# Patient Record
Sex: FEMALE | Race: BLACK OR AFRICAN AMERICAN
[De-identification: names, ages, dates, MRNs, and addresses within clinical notes are randomized per-mention and may not be internally consistent; named-entity substitution may affect disease eponyms.]

---

## 2019-09-29 ENCOUNTER — HOSPITAL ENCOUNTER (INPATIENT)
Dept: HOSPITAL 47 - EC | Age: 49
LOS: 4 days | Discharge: HOME | DRG: 392 | End: 2019-10-03
Attending: HOSPITALIST | Admitting: HOSPITALIST
Payer: COMMERCIAL

## 2019-09-29 VITALS — BODY MASS INDEX: 28.8 KG/M2

## 2019-09-29 DIAGNOSIS — D25.9: ICD-10-CM

## 2019-09-29 DIAGNOSIS — K29.50: Primary | ICD-10-CM

## 2019-09-29 DIAGNOSIS — J45.909: ICD-10-CM

## 2019-09-29 DIAGNOSIS — R82.71: ICD-10-CM

## 2019-09-29 DIAGNOSIS — Z98.891: ICD-10-CM

## 2019-09-29 DIAGNOSIS — Z71.6: ICD-10-CM

## 2019-09-29 DIAGNOSIS — N83.202: ICD-10-CM

## 2019-09-29 DIAGNOSIS — Z82.49: ICD-10-CM

## 2019-09-29 DIAGNOSIS — K44.9: ICD-10-CM

## 2019-09-29 DIAGNOSIS — K80.20: ICD-10-CM

## 2019-09-29 DIAGNOSIS — F17.210: ICD-10-CM

## 2019-09-29 LAB
ALBUMIN SERPL-MCNC: 4.7 G/DL (ref 3.5–5)
ALP SERPL-CCNC: 107 U/L (ref 38–126)
ALT SERPL-CCNC: <6 U/L (ref 9–52)
AMYLASE SERPL-CCNC: 114 U/L (ref 30–110)
ANION GAP SERPL CALC-SCNC: 13 MMOL/L
AST SERPL-CCNC: 44 U/L (ref 14–36)
BASOPHILS # BLD AUTO: 0.1 K/UL (ref 0–0.2)
BASOPHILS NFR BLD AUTO: 1 %
BUN SERPL-SCNC: 9 MG/DL (ref 7–17)
CALCIUM SPEC-MCNC: 9.9 MG/DL (ref 8.4–10.2)
CHLORIDE SERPL-SCNC: 106 MMOL/L (ref 98–107)
CO2 SERPL-SCNC: 18 MMOL/L (ref 22–30)
EOSINOPHIL # BLD AUTO: 0.1 K/UL (ref 0–0.7)
EOSINOPHIL NFR BLD AUTO: 1 %
ERYTHROCYTE [DISTWIDTH] IN BLOOD BY AUTOMATED COUNT: 4.98 M/UL (ref 3.8–5.4)
ERYTHROCYTE [DISTWIDTH] IN BLOOD: 14.3 % (ref 11.5–15.5)
GLUCOSE SERPL-MCNC: 117 MG/DL (ref 74–99)
HCT VFR BLD AUTO: 44.9 % (ref 34–46)
HGB BLD-MCNC: 15.2 GM/DL (ref 11.4–16)
KETONES UR QL STRIP.AUTO: (no result)
LYMPHOCYTES # SPEC AUTO: 0.9 K/UL (ref 1–4.8)
LYMPHOCYTES NFR SPEC AUTO: 15 %
MCH RBC QN AUTO: 30.6 PG (ref 25–35)
MCHC RBC AUTO-ENTMCNC: 33.9 G/DL (ref 31–37)
MCV RBC AUTO: 90.3 FL (ref 80–100)
MONOCYTES # BLD AUTO: 0.2 K/UL (ref 0–1)
MONOCYTES NFR BLD AUTO: 3 %
NEUTROPHILS # BLD AUTO: 4.7 K/UL (ref 1.3–7.7)
NEUTROPHILS NFR BLD AUTO: 78 %
PH UR: 6 [PH] (ref 5–8)
PLATELET # BLD AUTO: 229 K/UL (ref 150–450)
POTASSIUM SERPL-SCNC: 4.8 MMOL/L (ref 3.5–5.1)
PROT SERPL-MCNC: 8.6 G/DL (ref 6.3–8.2)
PROT UR QL: (no result)
RBC UR QL: >182 /HPF (ref 0–5)
SODIUM SERPL-SCNC: 137 MMOL/L (ref 137–145)
SP GR UR: 1.03 (ref 1–1.03)
SQUAMOUS UR QL AUTO: 11 /HPF (ref 0–4)
UROBILINOGEN UR QL STRIP: 2 MG/DL (ref ?–2)
WBC # BLD AUTO: 6 K/UL (ref 3.8–10.6)
WBC #/AREA URNS HPF: 7 /HPF (ref 0–5)

## 2019-09-29 PROCEDURE — 96374 THER/PROPH/DIAG INJ IV PUSH: CPT

## 2019-09-29 PROCEDURE — 43239 EGD BIOPSY SINGLE/MULTIPLE: CPT

## 2019-09-29 PROCEDURE — 96361 HYDRATE IV INFUSION ADD-ON: CPT

## 2019-09-29 PROCEDURE — 88305 TISSUE EXAM BY PATHOLOGIST: CPT

## 2019-09-29 PROCEDURE — 81001 URINALYSIS AUTO W/SCOPE: CPT

## 2019-09-29 PROCEDURE — 74177 CT ABD & PELVIS W/CONTRAST: CPT

## 2019-09-29 PROCEDURE — 76856 US EXAM PELVIC COMPLETE: CPT

## 2019-09-29 PROCEDURE — 84702 CHORIONIC GONADOTROPIN TEST: CPT

## 2019-09-29 PROCEDURE — 82150 ASSAY OF AMYLASE: CPT

## 2019-09-29 PROCEDURE — 76705 ECHO EXAM OF ABDOMEN: CPT

## 2019-09-29 PROCEDURE — 36415 COLL VENOUS BLD VENIPUNCTURE: CPT

## 2019-09-29 PROCEDURE — 80053 COMPREHEN METABOLIC PANEL: CPT

## 2019-09-29 PROCEDURE — 83690 ASSAY OF LIPASE: CPT

## 2019-09-29 PROCEDURE — 85025 COMPLETE CBC W/AUTO DIFF WBC: CPT

## 2019-09-29 PROCEDURE — 96375 TX/PRO/DX INJ NEW DRUG ADDON: CPT

## 2019-09-29 PROCEDURE — 99285 EMERGENCY DEPT VISIT HI MDM: CPT

## 2019-09-29 NOTE — CT
EXAMINATION TYPE: CT abdomen pelvis w con

 

DATE OF EXAM: 9/29/2019

 

COMPARISON: None

 

HISTORY: Abdominal pain

 

CT DLP: mGycm

Automated exposure control for dose reduction was used.

 

TECHNIQUE:  Helical acquisition of images was performed from the lung bases through the pelvis.

 

CONTRAST: 

Performed and , patient injected with mL of .

The contrast was Isovue 100 mL.

FINDINGS: 

Lung bases are clear. There is no pleural effusion. Heart is moderately enlarged.

 

There are small calcified gallstones. Liver appears normal. Bile ducts are not dilated. Spleen appear
s normal. There is no pancreatic mass. Stomach appears normal.

 

There is no adrenal mass. Kidneys show satisfactory contrast opacification. There is no hydronephrosi
s. Ureters are not dilated. There is no retroperitoneal adenopathy. There is massive enlargement of t
he uterus. Uterus measures 15 x 10 cm with apparent large mass on the anterior wall of the fundus. Ur
inary bladder is apparently empty. There is no free fluid in the pelvis. There is no inguinal hernia.


 

There is no mesenteric edema. There is no ascites or free air. Appendix appears normal. There is no s
ign of bowel obstruction.

There is a degenerative first-degree L4-5 spondylolisthesis without definite spondylolysis.

IMPRESSION: 

THERE IS LARGE MIDLINE PELVIC MASS THAT IS THOUGHT TO BE DUE TO MASSIVE ENLARGEMENT OF THE UTERUS. FO
LLOW-UP RECOMMENDED. OTHER PELVIC MASS ETIOLOGY NOT EXCLUDED..

 

CARDIOMEGALY.

## 2019-09-29 NOTE — US
EXAMINATION TYPE: US abdomen limited

 

DATE OF EXAM: 9/29/2019

 

COMPARISON: NONE

 

CLINICAL HISTORY: RUQ. N/V.  CT showed gallstones.

 

EXAM MEASUREMENTS:

 

Liver Length:  18.5 cm   

Gallbladder Wall:  0.2 cm   

CBD:  0.3 cm

Right Kidney:  10.2 x 6.3 x 4.4 cm

 

 

 

Pancreas:  wnl

Liver:  Enlarged in size  

Gallbladder:  Multiple mobile echogenic foci

**Evidence for sonographic Gonzales's sign:  neg

CBD:  wnl 

Right Kidney:  No hydronephrosis or masses seen 

 

 

 

IMPRESSION: There are numerous gallstones. No dilated ducts. No ascites.

## 2019-09-30 RX ADMIN — ONDANSETRON PRN MG: 2 INJECTION INTRAMUSCULAR; INTRAVENOUS at 18:58

## 2019-09-30 RX ADMIN — CEFAZOLIN SCH MLS/HR: 330 INJECTION, POWDER, FOR SOLUTION INTRAMUSCULAR; INTRAVENOUS at 07:56

## 2019-09-30 RX ADMIN — CEFAZOLIN SCH: 330 INJECTION, POWDER, FOR SOLUTION INTRAMUSCULAR; INTRAVENOUS at 18:52

## 2019-09-30 RX ADMIN — CEFAZOLIN SCH: 330 INJECTION, POWDER, FOR SOLUTION INTRAMUSCULAR; INTRAVENOUS at 02:03

## 2019-09-30 NOTE — CONS
CONSULTATION



DATE OF DICTATION:

2019



REASON FOR CONSULTATION:

Nausea, vomiting of 3 days' duration.



HISTORY OF PRESENT ILLNESS:

The patient is a 49-year-old pleasant white female who presented to the emergency room

complaining of abdominal pain associated with nausea, vomiting for the last 3 days'

duration.  The patient states that the pain is mostly in the suprapubic area and left

lower quadrant area, but she has been having at least several episodes of nausea and

vomiting for the last 2 days.  No coffee-ground emesis. She came into the emergency

room and she had a CT of the abdomen and pelvis done that showed significantly enlarged

uterus and presence of gallstones.  Otherwise it was unremarkable.  Patient at present

is on PPIs and Zofran and her symptoms are gradually improving.  She denies any

abdominal pain today.  She had one episode of emesis on a clear liquid diet this

afternoon.  No prior history of peptic ulcer disease.  No recent NSAID use.  She had a

similar episode in August of this year that lasted for 2 days and then it resolved.

She reports no prior history of peptic ulcer disease.



PAST MEDICAL HISTORY:

Asthma.



PAST SURGICAL HISTORY:

.



MEDICATIONS:

Medications at home include Motrin and Zofran.



ALLERGIES:

NONE.



SOCIAL HISTORY:

Chronic smoker.  No alcohol use.



FAMILY HISTORY:

Mother had hypertension.



REVIEW OF SYSTEMS:

CARDIOPULMONARY: No chest pain or shortness of breath.

GENITOURINARY: No dysuria or hematuria.

MUSCULOSKELETAL: Unremarkable.

SKIN: Unremarkable.

ENDOCRINE: Unremarkable.

PSYCHIATRIC: Unremarkable.

NEUROLOGY: Unremarkable.

ENT/VISION: Unremarkable.

CONSTITUTIONAL: No recent weight loss. No fever, chills, night sweats.



PHYSICAL EXAMINATION:

Blood pressure 166/84, pulse rate 74, temperature 98.1.

HEENT examination unremarkable. Conjunctivae pink. Sclerae anicteric. Oral cavity no

lesions.

NECK: No JVD or lymph node enlargement.

CHEST: Clear to auscultation.

HEART:  Regular rate and rhythm.

ABDOMEN:  Soft.  There was mild tenderness in the epigastric area. Uterus was palpable

almost to the umbilicus, which appeared tender.

EXTREMITIES: No pedal edema.

SKIN: No rashes.

NEUROLOGIC:  Alert and oriented x3.  No focal deficits.



LABS/IMAGING:

WBC 6, hemoglobin 15.2, platelets normal.  AST, ALT 44 and 6, respectively.  T-

bilirubin 1.5, alkaline phosphatase 107. Amylase 114, lipase 45.



CT of the abdomen and pelvis done in the emergency room showed evidence of gallstones

and significantly enlarged uterus.  Liver appears normal.  Bile ducts are not dilated.



IMPRESSION:

1. Acute onset of nausea and vomiting for the last 3 days' duration.  Patient at

    present on PPIs with Protonix 40 mg twice daily as well as Zofran and Reglan as

    needed. Her symptoms appear to be gradually improving.  The nausea and vomiting

    could be secondary to lower abdominal pain that she presented with related to the

    pelvic mass.

2. Pelvic mass/significantly enlarged uterus that can be clinically palpable.  GYN was

    consulted.

3. Gallstones noted on recent CT scan; unclear if they are causing any symptoms at the

    present time.



RECOMMENDATIONS:

1. Continue with Protonix as well as Zofran.

2. Continue with Reglan as needed.

3. If the nausea and vomiting do not resolve in the next 24 hours, we will consider an

    upper endoscopy during this hospitalization.

4. Await GYN consultation.

We will follow the patient closely during her hospital stay. Thank you for this

consultation.





JOSE GUADALUPE / DEISI: [id number] / Job#: [id number]

## 2019-09-30 NOTE — P.HPIM
History of Present Illness





49-year-old female with a past medical history of  presents to the 

emergency department for a chief complaint of nausea and vomiting.  Patient has 

had nausea and vomiting for 2 days.  States that she has had this type of sy

mptom before but it has gone away.  Patient also admits to lower abdominal pain.

 Denies any upper abdominal pain.  Denies any diarrhea.  Patient had a CAT scan 

of the abdomen which showed midline pelvic mass massive enlargement of the 

uterus, ultrasound of the abdomen did show multiple gallstones may be 

contributing to his nausea.  Patient's pain is presently made lower quadrant of 

the abdomen and 7/10 in severity.  Patient denied dysuria





Past Medical History


Past Medical History: No Reported History, Asthma


History of Any Multi-Drug Resistant Organisms: None Reported


Past Surgical History:  Section


Past Psychological History: No Psychological Hx Reported


Smoking Status: Current every day smoker


Past Alcohol Use History: Rare


Past Drug Use History: Marijuana


Additional Drug Use History / Comment(s): uses marijuana daily.





- Past Family History


  ** Mother


Family Medical History: Hypertension





Medications and Allergies


                                Home Medications











 Medication  Instructions  Recorded  Confirmed  Type


 


Ibuprofen [Motrin Ib] 600 mg PO Q6H PRN 19 History


 


Zofran Unknown Dose 1 tab PO Q4H PRN 19 History








                                    Allergies











Allergy/AdvReac Type Severity Reaction Status Date / Time


 


No Known Allergies Allergy   Verified 19 15:18














Physical Exam


Vitals: 


                                   Vital Signs











  Temp Pulse Pulse Resp BP BP Pulse Ox


 


 19 06:53  98.4 F   72  16   167/80  98


 


 19 01:22       183/93 


 


 19 23:28  97.3 F L   70  18   176/105  99


 


 19 23:01  98.7 F  83   18  171/95   100


 


 19 19:21   84   18  176/95   98


 


 19 14:03  97.6 F  94   20  184/97   99








                                Intake and Output











 19





 22:59 06:59 14:59


 


Other:   


 


  Voiding Method  Toilet 

















PHYSICAL EXAMINATION: 





GENERAL: The patient is alert and oriented x3, not in any acute distress. Well 

developed, well nourished. 


HEENT: Pupils are round and equally reacting to light. EOMI. No scleral icterus.

No conjunctival pallor. Normocephalic, atraumatic. No pharyngeal erythema. No 

thyromegaly. 


CARDIOVASCULAR: S1 and S2 present. No murmurs, rubs, or gallops. 


PULMONARY: Chest is clear to auscultation, no wheezing or crackles. 


ABDOMEN: Soft, no tenderness in the suprapubic area and that her uterus is 

palpable and appreciable.  In that area, nondistended, normoactive bowel sounds.

No palpable organomegaly. 


MUSCULOSKELETAL: No joint swelling or deformity.


EXTREMITIES: No cyanosis, clubbing, or pedal edema. 


NEUROLOGICAL: Gross neurological examination did not reveal any focal deficits. 


SKIN: No rashes. 

















Results


CBC & Chem 7: 


                                 19 15:05





                                 19 15:05


Labs: 


                  Abnormal Lab Results - Last 24 Hours (Table)











  19 Range/Units





  15:05 15:05 15:05 


 


Lymphocytes #   0.9 L   (1.0-4.8)  k/uL


 


Carbon Dioxide  18 L    (22-30)  mmol/L


 


Glucose  117 H    (74-99)  mg/dL


 


Total Bilirubin  1.5 H    (0.2-1.3)  mg/dL


 


AST  44 H    (14-36)  U/L


 


ALT  <6 L    (9-52)  U/L


 


Total Protein  8.6 H    (6.3-8.2)  g/dL


 


Amylase  114 H    ()  U/L


 


Urine Appearance    Cloudy H  (Clear)  


 


Urine Protein    1+ H  (Negative)  


 


Urine Ketones    2+ H  (Negative)  


 


Urine Blood    Large H  (Negative)  


 


Ur Leukocyte Esterase    Small H  (Negative)  


 


Urine RBC    >182 H  (0-5)  /hpf


 


Urine WBC    7 H  (0-5)  /hpf


 


Ur Squamous Epith Cells    11 H  (0-4)  /hpf


 


Urine Mucus    Many H  (None)  /hpf














Thrombosis Risk Factor Assmnt





- Choose All That Apply


Any of the Below Risk Factors Present?: Yes


Each Factor Represents 1 point: Age 41-60 years


Other Risk Factors: No


Other congenital or acquired thrombophilia - If yes, enter type in comment: No


Thrombosis Risk Factor Assessment Total Risk Factor Score: 1


Thrombosis Risk Factor Assessment Level: Low Risk





Assessment and Plan


Plan: 


-Nausea: Probably secondary to gallstones general surgery was consult and the 

responsibility of gastritis to patient takes nonsteroidal anti-inflammatory set 

home and will be started on Protonix.


-Abdominal pain may be related to enlarged uterus, gynecology was consult it.


-Asymptomatic bacteriuria contaminated urine sample we'll repeat UA again 

although patient doesn't have any symptoms of UTI except for suprapubic pain 

which may be related to enlarged uterus


-Asthma without any acute exacerbation


-Nicotine abuse and marijuana use: Counseling was provided

## 2019-10-01 LAB
ALBUMIN SERPL-MCNC: 3.9 G/DL (ref 3.5–5)
ALP SERPL-CCNC: 68 U/L (ref 38–126)
ALT SERPL-CCNC: 25 U/L (ref 9–52)
ANION GAP SERPL CALC-SCNC: 11 MMOL/L
AST SERPL-CCNC: 23 U/L (ref 14–36)
BUN SERPL-SCNC: 13 MG/DL (ref 7–17)
CALCIUM SPEC-MCNC: 9.1 MG/DL (ref 8.4–10.2)
CHLORIDE SERPL-SCNC: 99 MMOL/L (ref 98–107)
CO2 SERPL-SCNC: 24 MMOL/L (ref 22–30)
GLUCOSE SERPL-MCNC: 89 MG/DL (ref 74–99)
POTASSIUM SERPL-SCNC: 3.6 MMOL/L (ref 3.5–5.1)
PROT SERPL-MCNC: 7.2 G/DL (ref 6.3–8.2)
SODIUM SERPL-SCNC: 134 MMOL/L (ref 137–145)

## 2019-10-01 RX ADMIN — CEFAZOLIN SCH MLS/HR: 330 INJECTION, POWDER, FOR SOLUTION INTRAMUSCULAR; INTRAVENOUS at 03:04

## 2019-10-01 RX ADMIN — CEFAZOLIN SCH MLS/HR: 330 INJECTION, POWDER, FOR SOLUTION INTRAMUSCULAR; INTRAVENOUS at 06:58

## 2019-10-01 RX ADMIN — PANTOPRAZOLE SODIUM SCH MG: 40 TABLET, DELAYED RELEASE ORAL at 06:58

## 2019-10-01 RX ADMIN — ONDANSETRON PRN MG: 2 INJECTION INTRAMUSCULAR; INTRAVENOUS at 03:04

## 2019-10-01 NOTE — US
EXAMINATION TYPE: US pelvic complete

 

DATE OF EXAM: 10/1/2019

 

COMPARISON: CT 2019 

 

CLINICAL HISTORY: enlarged uterus. History of ectopic pregnancy. .

 

TECHNIQUE:  Transabdominal (TA).  Transabdominal sonographic images of the pelvis were acquired.  Tra
nsvaginal sonographic images were medically necessary to better assess the following anatomy:

 

Date of LMP:  2019

 

EXAM MEASUREMENTS:

 

Uterus:  17.1 x 13.7 x 9.5 cm

Endometrial Stripe: Unable to see cm

Right Ovary:  Surgically absent cm

Left Ovary:  3.6 x 2.6 x 2.3 cm

 

 

 

1. Uterus:  Anteverted   Enlarged with heterogeneous texture. ? Multiple myomas

2. Endometrium:  Not well seen with uterine heterogenicity.

3. Right Ovary:  Surgically absent d/t ectopic

4. Left Ovary:  with small cyst = 1.5 x 1.7 x 1.3 cm 

**Spectral, color and waveform doppler imaging shows good arterial and venous flow within the ovaries
; there is no evidence for ovarian torsion.

5. Bilateral Adnexa:  wnl

6. Posterior cul-de-sac:  wnl

 

 

 

IMPRESSION: 

1. There may be multiple uterine fibroids present.

2. Small left ovarian cyst measuring 1.5 cm

## 2019-10-01 NOTE — PN
PROGRESS NOTE



DATE OF DICTATION:

10/01/2019



The patient is a 49-year-old -American female admitted to the hospital with

abdominal pain associated with nausea, vomiting for the last 3 days' duration.  She

continues to remain symptomatic despite being on Reglan and Zofran as well as Protonix.

She had about 3 episodes of emesis this morning.  Reports no fever, chills or night

sweats.



PHYSICAL EXAMINATION:

She appears comfortable.  No apparent distress.

VITAL SIGNS:  Stable.  Blood pressure is 154/78, pulse rate 81, temperature 99.1.

HEENT examination unremarkable. Conjunctivae pink. Sclerae anicteric. Oral cavity no

lesions.

NECK: No JVD or lymph node enlargement.

CHEST: Clear to auscultation.

HEART:  Regular rate and rhythm.

ABDOMEN:  Soft.  Uterus is palpable almost to the umbilical area.



Pelvis ultrasound done today showed enlarged heterogeneous uterus, possible multiple

myomas.



IMPRESSION:

1. Nausea, vomiting for the last 3 days' duration.  Rule out peptic ulcer disease.

2. Gallstones.

3. Enlarged uterus.



RECOMMENDATIONS:

1. Continue with Protonix, Zofran, Reglan as needed.

2. Proceed with an upper endoscopy tomorrow.

3. Discussed with the patient risks, benefits and complications of the procedure, and

    she is agreeable to it.

Thank you for this consultation.





MMODL / IJN: [id number] / Job#: [id number]

## 2019-10-01 NOTE — P.OBCN
History of Present Illness


Consult date: 10/01/19


Reason for consult: pelvic mass (Enlarged uterus)


Chief complaint: Nausea


History of present illness: 


49-year-old  presents to the hospital complaining of nausea vomiting and 

abdominal pain.  On CT scanned was found incidentally in the pelvis, enlarged 

uterus; 15 x 10 cm.  I was consulted for the enlarged uterus.  The patient has a

pretty good stories tell.  3 years ago she weighed 363 pounds.  Advice from her 

doctor due to her poor knees, she lost over 100 pounds by going to [employer] and eating less.  She started at [employer] and continues 

her diet, water and exercise.  As she was losing her weight, she did note a firm

lump in her abdomen.  On exam today is noted to be her uterus.  Therefore, this 

is not a new problem she's had this large uterus with likely fibroids for quite 

some time.  I will get an ultrasound on her pelvis today in follow-up of her 

outpatient.  She will likely need a hysterectomy but at this time, she needs to 

find the white is causing the nausea and deal with that first.








Review of Systems


All systems: negative


Constitutional: Denies chills, Denies fever


Eyes: denies blurred vision, denies pain


Ears, nose, mouth and throat: Denies headache, Denies sore throat


Cardiovascular: Denies chest pain, Denies shortness of breath


Respiratory: Denies cough


Gastrointestinal: Reports nausea, Reports vomiting, Denies abdominal pain, 

Denies diarrhea


Genitourinary: Denies dysuria, Denies hematuria


Musculoskeletal: Denies myalgias


Integumentary: Denies pruritus, Denies rash


Neurological: Denies numbness, Denies weakness


Psychiatric: Denies anxiety, Denies depression


Endocrine: Denies fatigue, Denies weight change





Past Medical History


Past Medical History: No Reported History, Asthma


Additional Past Medical History / Comment(s): Her menses are irregular.  She had

one in March and is currently on that again.  They last about 3 days are 

extremely heavy.


History of Any Multi-Drug Resistant Organisms: None Reported


Past Surgical History:  Section


Past Psychological History: No Psychological Hx Reported


Smoking Status: Current every day smoker


Past Alcohol Use History: Rare


Past Drug Use History: Marijuana


Additional Drug Use History / Comment(s): uses marijuana daily.





- Past Family History


  ** Mother


Family Medical History: Hypertension





Medications and Allergies


                                Home Medications











 Medication  Instructions  Recorded  Confirmed  Type


 


Ibuprofen [Motrin Ib] 600 mg PO Q6H PRN 19 History


 


Zofran Unknown Dose 1 tab PO Q4H PRN 19 History








                                    Allergies











Allergy/AdvReac Type Severity Reaction Status Date / Time


 


No Known Allergies Allergy   Verified 19 15:18














Exam


Osteopathic Statement: *.  No significant issues noted on an osteopathic 

structural exam other than those noted in the History and Physical/Consult.


                                   Vital Signs











  Temp Pulse Resp BP Pulse Ox


 


 10/01/19 04:52  98.4 F  71  16  162/80  100


 


 19 21:13  98.8 F  72  16  171/98  97


 


 19 13:34  98.1 F  74  16  166/84  100








                                Intake and Output











 09/30/19 10/01/19 10/01/19





 22:59 06:59 14:59


 


Other:   


 


  Voiding Method  Toilet Toilet


 


  # Voids 3 3 














Abdomen: Her uterus is firm, mobile, and is about 2 finger breaths below the um

bilicus.





Results


Result Diagrams: 


                                 19 15:05





                                 19 15:05





Assessment and Plan


(1) Fibroid uterus


Current Visit: Yes   Status: Acute   Code(s): D25.9 - LEIOMYOMA OF UTERUS, 

UNSPECIFIED   SNOMED Code(s): 20436042


   





(2) Cholelithiasis


Current Visit: Yes   Status: Acute   Code(s): K80.20 - CALCULUS OF GALLBLADDER 

W/O CHOLECYSTITIS W/O OBSTRUCTION   SNOMED Code(s): 631500920


   


Plan: 





1.  I will get an ultrasound of the pelvis while patient is inpatient.


2.  She likely does need a hysterectomy as the uterus is causing her frequency 

of urination and some pelvic discomfort at times.  I will discuss this further 

with the patient in outpatient setting.  She can follow up in my office in about

2 weeks after she is discharged from this hospital.

## 2019-10-01 NOTE — P.GSCN
<Franchesca Wise - Last Filed: 10/01/19 14:03>





History of Present Illness


Consult date: 10/01/19


Reason for Consult: 





gallstones





Requesting physician: Rocael Enciso


History of present illness: 





CHIEF COMPLAINT: gallstones





HISTORY OF PRESENT ILLNESS: 49 year old female admitted to the hospital sec

ondary to abdominal pain. Patient reports she began having lower bilateral 

quadrant abdominal pain a few days ago. She reports nausea and emesis for the 

last two days. This morning she reports continued nausea and one episode of 

emesis. She denies diarrhea or constipation. Patient reports having similiar 

episodes to this about every 2-3 months and states she was hospitalized at [facility] recently for similar symptoms. She states she was not given

a diagnosis and was discharged home. 





PAST MEDICAL HISTORY: 


See list.





PAST SURGICAL HISTORY: 


See list.





SOCIAL HISTORY: No illicit drug use.  





REVIEW OF SYSTEMS: 


CONSTITUTIONAL: Denies fever or chills.


HEENT: Denies blurred vision, vision changes, or eye pain. Denies hemoptysis 


CARDIOVASCULAR: Denies chest pain or pressure.


RESPIRATORY: No shortness of breath. 


GASTROINTESTINAL: Refer to HPI for pertinent findings


HEMATOLOGIC: Denies bleeding disorders.


GENITOURINARY:  Denies any blood in urine.


SKIN: Denies pruitis. Denies rash.





PHYSICAL EXAM: 


VITAL SIGNS: Reviewed.


GENERAL: Well-developed in no acute distress. 


HEENT:  No sclera icterus. Extraocular movements grossly intact.  Moist buccal 

mucosa. Head is atraumatic, normocephalic. 


ABDOMEN:  Soft.  Nondistended. Tenderness with palpation of suprapubic region. 

Palpable enlarged uterus.


NEUROLOGIC: Alert and oriented. Cranial nerves II through XII grossly intact.





LABORATORY DATA:


WBC 6.0. Hemoglobin 15.2


Bilirubin 1.5.  AST 44.  ALT less than 6.  Alkaline phosphatase 107.  Amylase 

114.  Lipase 45.





IMAGIN. CT abdominal pelvis: large pelvic mass thought to be uterine enlargement


2. US gallbladder: numerous gallstones





ASSESSMENT: 


1.  Nausea vomiting x 2 days


2.  Cholelithiasis


3.  Enlarged uterus





PLAN: 


Case discussed with Dr. Osborne. No surgical intervention recommended at this 

time. Recommend continued follow up with OBGYN for uterine enlargement. Possible

EGD per Dr. Saeed's dictation. Pending EGD results, will re-evaluate for 

cholecystectomy.





Nurse practitioner note has been reviewed by physician. Signing provider agrees 

with the documented findings, assessment, and plan of care. 








Past Medical History


Past Medical History: No Reported History, Asthma


Additional Past Medical History / Comment(s): Her menses are irregular.  She had

one in March and is currently on that again.  They last about 3 days are 

extremely heavy.


History of Any Multi-Drug Resistant Organisms: None Reported


Past Surgical History:  Section


Past Psychological History: No Psychological Hx Reported


Smoking Status: Current every day smoker


Past Alcohol Use History: Rare


Past Drug Use History: Marijuana


Additional Drug Use History / Comment(s): uses marijuana daily.





- Past Family History


  ** Mother


Family Medical History: Hypertension





Medications and Allergies


                                Home Medications











 Medication  Instructions  Recorded  Confirmed  Type


 


Ibuprofen [Motrin Ib] 600 mg PO Q6H PRN 19 History


 


Zofran Unknown Dose 1 tab PO Q4H PRN 19 History








                                    Allergies











Allergy/AdvReac Type Severity Reaction Status Date / Time


 


No Known Allergies Allergy   Verified 19 15:18














Surgical - Exam


                                   Vital Signs











Temp Pulse Resp BP Pulse Ox


 


 97.6 F   94   20   184/97   99 


 


 19 14:03  19 14:03  19 14:03  19 14:03  19 14:03














Results





- Labs





                                 19 15:05





                                 10/01/19 12:48


                  Abnormal Lab Results - Last 24 Hours (Table)











  10/01/19 Range/Units





  12:48 


 


Sodium  134 L  (137-145)  mmol/L








                                 Diabetes panel











  10/01/19 Range/Units





  12:48 


 


Sodium  134 L  (137-145)  mmol/L


 


Potassium  3.6  (3.5-5.1)  mmol/L


 


Chloride  99  ()  mmol/L


 


Carbon Dioxide  24  (22-30)  mmol/L


 


BUN  13  (7-17)  mg/dL


 


Creatinine  0.65  (0.52-1.04)  mg/dL


 


Glucose  89  (74-99)  mg/dL


 


Calcium  9.1  (8.4-10.2)  mg/dL


 


AST  23  (14-36)  U/L


 


ALT  25  (9-52)  U/L


 


Alkaline Phosphatase  68  ()  U/L


 


Total Protein  7.2  (6.3-8.2)  g/dL


 


Albumin  3.9  (3.5-5.0)  g/dL








                                  Calcium panel











  10/01/19 Range/Units





  12:48 


 


Calcium  9.1  (8.4-10.2)  mg/dL


 


Albumin  3.9  (3.5-5.0)  g/dL








                                 Pituitary panel











  10/01/19 Range/Units





  12:48 


 


Sodium  134 L  (137-145)  mmol/L


 


Potassium  3.6  (3.5-5.1)  mmol/L


 


Chloride  99  ()  mmol/L


 


Carbon Dioxide  24  (22-30)  mmol/L


 


BUN  13  (7-17)  mg/dL


 


Creatinine  0.65  (0.52-1.04)  mg/dL


 


Glucose  89  (74-99)  mg/dL


 


Calcium  9.1  (8.4-10.2)  mg/dL








                                  Adrenal panel











  10/01/19 Range/Units





  12:48 


 


Sodium  134 L  (137-145)  mmol/L


 


Potassium  3.6  (3.5-5.1)  mmol/L


 


Chloride  99  ()  mmol/L


 


Carbon Dioxide  24  (22-30)  mmol/L


 


BUN  13  (7-17)  mg/dL


 


Creatinine  0.65  (0.52-1.04)  mg/dL


 


Glucose  89  (74-99)  mg/dL


 


Calcium  9.1  (8.4-10.2)  mg/dL


 


Total Bilirubin  0.9  (0.2-1.3)  mg/dL


 


AST  23  (14-36)  U/L


 


ALT  25  (9-52)  U/L


 


Alkaline Phosphatase  68  ()  U/L


 


Total Protein  7.2  (6.3-8.2)  g/dL


 


Albumin  3.9  (3.5-5.0)  g/dL














<Los Osborne - Last Filed: 10/02/19 07:54>





History of Present Illness


History of present illness: 


As above.  Patient was seen yesterday around 11:30 AM.  Patient with 

intermittent episodes of nausea and vomiting.  No significant pain.  Would be 

willing to consider cholecystectomy at time of hysterectomy.  Otherwise continue

GI workup of ongoing nausea.








Surgical - Exam


                                   Vital Signs











Temp Pulse Resp BP Pulse Ox


 


 97.6 F   94   20   184/97   99 


 


 19 14:03  19 14:03  19 14:03  19 14:03  19 14:03














Results





- Labs





                                 19 15:05





                                 10/01/19 12:48


                  Abnormal Lab Results - Last 24 Hours (Table)











  10/01/19 Range/Units





  12:48 


 


Sodium  134 L  (137-145)  mmol/L








                                 Diabetes panel











  10/01/19 Range/Units





  12:48 


 


Sodium  134 L  (137-145)  mmol/L


 


Potassium  3.6  (3.5-5.1)  mmol/L


 


Chloride  99  ()  mmol/L


 


Carbon Dioxide  24  (22-30)  mmol/L


 


BUN  13  (7-17)  mg/dL


 


Creatinine  0.65  (0.52-1.04)  mg/dL


 


Glucose  89  (74-99)  mg/dL


 


Calcium  9.1  (8.4-10.2)  mg/dL


 


AST  23  (14-36)  U/L


 


ALT  25  (9-52)  U/L


 


Alkaline Phosphatase  68  ()  U/L


 


Total Protein  7.2  (6.3-8.2)  g/dL


 


Albumin  3.9  (3.5-5.0)  g/dL








                                  Calcium panel











  10/01/19 Range/Units





  12:48 


 


Calcium  9.1  (8.4-10.2)  mg/dL


 


Albumin  3.9  (3.5-5.0)  g/dL








                                 Pituitary panel











  10/01/19 Range/Units





  12:48 


 


Sodium  134 L  (137-145)  mmol/L


 


Potassium  3.6  (3.5-5.1)  mmol/L


 


Chloride  99  ()  mmol/L


 


Carbon Dioxide  24  (22-30)  mmol/L


 


BUN  13  (7-17)  mg/dL


 


Creatinine  0.65  (0.52-1.04)  mg/dL


 


Glucose  89  (74-99)  mg/dL


 


Calcium  9.1  (8.4-10.2)  mg/dL








                                  Adrenal panel











  10/01/19 Range/Units





  12:48 


 


Sodium  134 L  (137-145)  mmol/L


 


Potassium  3.6  (3.5-5.1)  mmol/L


 


Chloride  99  ()  mmol/L


 


Carbon Dioxide  24  (22-30)  mmol/L


 


BUN  13  (7-17)  mg/dL


 


Creatinine  0.65  (0.52-1.04)  mg/dL


 


Glucose  89  (74-99)  mg/dL


 


Calcium  9.1  (8.4-10.2)  mg/dL


 


Total Bilirubin  0.9  (0.2-1.3)  mg/dL


 


AST  23  (14-36)  U/L


 


ALT  25  (9-52)  U/L


 


Alkaline Phosphatase  68  ()  U/L


 


Total Protein  7.2  (6.3-8.2)  g/dL


 


Albumin  3.9  (3.5-5.0)  g/dL

## 2019-10-02 VITALS — RESPIRATION RATE: 16 BRPM

## 2019-10-02 PROCEDURE — 0DB78ZX EXCISION OF STOMACH, PYLORUS, VIA NATURAL OR ARTIFICIAL OPENING ENDOSCOPIC, DIAGNOSTIC: ICD-10-PCS

## 2019-10-02 RX ADMIN — CEFAZOLIN SCH: 330 INJECTION, POWDER, FOR SOLUTION INTRAMUSCULAR; INTRAVENOUS at 16:30

## 2019-10-02 RX ADMIN — CEFAZOLIN SCH MLS/HR: 330 INJECTION, POWDER, FOR SOLUTION INTRAMUSCULAR; INTRAVENOUS at 07:12

## 2019-10-02 RX ADMIN — PANTOPRAZOLE SODIUM SCH: 40 TABLET, DELAYED RELEASE ORAL at 07:11

## 2019-10-02 RX ADMIN — CEFAZOLIN SCH: 330 INJECTION, POWDER, FOR SOLUTION INTRAMUSCULAR; INTRAVENOUS at 03:27

## 2019-10-02 NOTE — P.PN
Subjective


Progress Note Date: 10/02/19


Principal diagnosis: 





Nausea and vomiting





Patient scheduled today for EGD by GI.  Patient is very belligerent at this 

time.  She states multiple times that we are starting her period she is quite 

hungry.  She denies nausea.  No vomiting overnight.  Denies abdominal pain.





Objective





- Vital Signs


Vital signs: 


                                   Vital Signs











Temp  98.2 F   10/02/19 07:00


 


Pulse  70   10/02/19 07:00


 


Resp  16   10/02/19 07:00


 


BP  138/50   10/02/19 07:00


 


Pulse Ox  99   10/02/19 07:00








                                 Intake & Output











 10/01/19 10/02/19 10/02/19





 18:59 06:59 18:59


 


Intake Total 540 200 


 


Balance 540 200 


 


Intake:   


 


  Oral 540 200 


 


Other:   


 


  Voiding Method Toilet Toilet Toilet


 


  # Voids 2 1 3














- Exam





Abdomen: Soft, nontender, nondistended





- Labs


CBC & Chem 7: 


                                 09/29/19 15:05





                                 10/01/19 12:48


Labs: 


                  Abnormal Lab Results - Last 24 Hours (Table)











  10/01/19 Range/Units





  12:48 


 


Sodium  134 L  (137-145)  mmol/L














Assessment and Plan


(1) Cholelithiasis


Narrative/Plan: 


Patient very angry at this time because of the waiting to have her EGD 

performed.  Denies abdominal pain.  No right upper quadrant tenderness.  No 

plans for cholecystectomy at this time.  We'll sign off.  Please call if needed.


Current Visit: Yes   Status: Acute   Code(s): K80.20 - CALCULUS OF GALLBLADDER 

W/O CHOLECYSTITIS W/O OBSTRUCTION   SNOMED Code(s): 690255074

## 2019-10-02 NOTE — P.PCN
Date of Procedure: 10/02/19


Procedure(s) Performed: 


BRIEF HISTORY: Patient is a 49-year-old, pleasant, half intermittent female, 

admitted to the hospital with abdominal pain associated with nausea vomiting for

the last 3 days' duration.  She is hence scheduled for an upper endoscopy to 

evaluate further. 





PROCEDURE PERFORMED: Esophagogastroduodenoscopy with biopsy.





PREOPERATIVE DIAGNOSIS: Nausea vomiting for the last 3 days. 





IV sedation per anesthesia. 





PROCEDURE: After informed consent was obtained, the patient  was brought into 

the endoscopy unit. IV sedation was administered by Anesthesia under continuous 

monitoring. Initially the Olympus GIF-140 video endoscope was inserted into the 

mouth. Esophagus intubated without any difficulty. It was gradually advanced 

into the stomach and duodenum and carefully examined. The bulb and the second 

part of the duodenum appeared normal. The scope at this time was withdrawn to 

the stomach, adequately insufflated with air, and upon careful examination, 

mucosa of the antrum had mild patchy areas of erythema.  The prepyloric area 

which was biopsied.  The, body, cardia and the fundus appeared normal. The scope

was then withdrawn into the esophagus. The GE junction was located at 35 cm from

the incisors.  Small to moderate size hiatal hernia noted.  The esophagus 

appeared normal. There were no erosions or ulcerations seen and the patient 

tolerated the procedure well. 





IMPRESSION: 


1.  Minimal antral gastritis.


2.  Small to moderate size hiatal hernia.


3.  No evidence of peptic ulcer disease





RECOMMENDATIONS: The findings of this examination were discussed with the 

patient.  She was advised to follow with the biopsy results.  Diet will be 

advanced as tolerated.  Continue with Protonix 40 mg daily...

## 2019-10-02 NOTE — P.PN
Subjective


Progress Note Date: 10/01/19


Principal diagnosis: 


49-year-old female with a past medical history of  presents to the 

emergency department for a chief complaint of nausea and vomiting.  Patient has 

had nausea and vomiting for 2 days.  States that she has had this type of 

symptom before but it has gone away.  Patient also admits to lower abdominal 

pain.  Denies any upper abdominal pain.  Denies any diarrhea.  Patient had a CAT

scan of the abdomen which showed midline pelvic mass massive enlargement of the 

uterus, ultrasound of the abdomen did show multiple gallstones may be 

contributing to his nausea.  Patient's pain is presently made lower quadrant of 

the abdomen and 7/10 in severity.  Patient denied dysuria





10/1/2019


Patient is lying in bed in some mild discomfort due to nausea and vomiting. 

Patient has zofran ordered and states that it doesn't help much. Patient was 

just seen by OBGYN for the enlarged uterus and an ultrasound is being ordered. 

Patient is also being consulted by GI as well as surgery. Patient states that 

she had a previous episode of this nausea, vomiting, and abdominal pain 

approximately 2 months ago and was sent home with zofran and continued to vomit 

for three days and then started getting better. Patient states that she would 

like to get her gallbladder taken out. Patient also states that she started her 

menses on Saturday the same time that these symptoms started. Her last menstrual

period was in March of this year. Patient denies any chest pain, shortness of 

breath, or palpitations at this time. Patient is afebrile. Will continue to 

monitor closely and await surgery as well as GI recommendations. 








Objective





- Vital Signs


Vital signs: 


                                   Vital Signs











Temp  99.1 F   10/01/19 13:13


 


Pulse  81   10/01/19 13:13


 


Resp  16   10/01/19 13:13


 


BP  155/78   10/01/19 13:13


 


Pulse Ox  100   10/01/19 13:13








                                 Intake & Output











 10/01/19 10/01/19 10/02/19





 06:59 18:59 06:59


 


Intake Total  540 


 


Balance  540 


 


Intake:   


 


  Oral  540 


 


Other:   


 


  Voiding Method Toilet Toilet 


 


  # Voids 3 2 














- Exam


GENERAL: The patient is alert and oriented x3, not in any acute distress. Well 

developed, well nourished. 


HEENT: Pupils are round and equally reacting to light. EOMI. No scleral icterus.

No conjunctival pallor. Normocephalic, atraumatic. No pharyngeal erythema. No 

thyromegaly. 


CARDIOVASCULAR: S1 and S2 present. No murmurs, rubs, or gallops. 


PULMONARY: Chest is clear to auscultation, no wheezing or crackles. 


ABDOMEN: Soft, no tenderness in the suprapubic area and that her uterus is 

palpable and appreciable and firm.  In that area, nondistended, normoactive 

bowel sounds. No palpable organomegaly. 


MUSCULOSKELETAL: No joint swelling or deformity.


EXTREMITIES: No cyanosis, clubbing, or pedal edema. 


NEUROLOGICAL: Gross neurological examination did not reveal any focal deficits. 


SKIN: No rashes.








- Labs


CBC & Chem 7: 


                                 19 15:05





                                 10/01/19 12:48


Labs: 


                  Abnormal Lab Results - Last 24 Hours (Table)











  10/01/19 Range/Units





  12:48 


 


Sodium  134 L  (137-145)  mmol/L














Assessment and Plan


Assessment: 


-Nausea: Probably secondary to gallstones, general surgery was consulted and the

responsibility of gastritis to patient takes nonsteroidal anti-inflammatory at 

home and will be started on Protonix.


-Abdominal pain may be related to enlarged uterus, gynecology was consulted. An 

ultrasound of the uterus is ordered. OBGYN will follow patient in the outpatient

setting. 


-Asymptomatic bacteriuria contaminated urine sample we'll repeat UA again 

although patient doesn't have any symptoms of UTI except for suprapubic pain 

which may be related to enlarged uterus


-Asthma without any acute exacerbation


-Nicotine abuse and marijuana use: Counseling was provided

## 2019-10-02 NOTE — P.PN
Subjective


Progress Note Date: 10/02/19


Principal diagnosis: 


49-year-old female with a past medical history of  presents to the 

emergency department for a chief complaint of nausea and vomiting.  Patient has 

had nausea and vomiting for 2 days.  States that she has had this type of 

symptom before but it has gone away.  Patient also admits to lower abdominal 

pain.  Denies any upper abdominal pain.  Denies any diarrhea.  Patient had a CAT

scan of the abdomen which showed midline pelvic mass massive enlargement of the 

uterus, ultrasound of the abdomen did show multiple gallstones may be 

contributing to his nausea.  Patient's pain is presently made lower quadrant of 

the abdomen and 7/10 in severity.  Patient denied dysuria





10/1/2019


Patient is lying in bed in some mild discomfort due to nausea and vomiting. 

Patient has zofran ordered and states that it doesn't help much. Patient was 

just seen by OBGYN for the enlarged uterus and an ultrasound is being ordered. 

Patient is also being consulted by GI as well as surgery. Patient states that 

she had a previous episode of this nausea, vomiting, and abdominal pain 

approximately 2 months ago and was sent home with zofran and continued to vomit 

for three days and then started getting better. Patient states that she would 

like to get her gallbladder taken out. Patient also states that she started her 

menses on Saturday the same time that these symptoms started. Her last menstrual

period was in March of this year. Patient denies any chest pain, shortness of 

breath, or palpitations at this time. Patient is afebrile. Will continue to 

monitor closely and await surgery as well as GI recommendations. 





10/02/2019


Patient is lying in bed in no acute distress talking on the phone.  Patient is 

nothing by mouth at this time as she is supposed to go for an upper endoscope 

with GI early this afternoon.  Patient states that she has not had any vomiting 

since yesterday around 6 AM.  Patient denies any bowel movements.  Patient 

states that the nausea is intermittent and she does have some this morning.  

Patient denies any chest pain, shortness of breath, or palpitations at this 

time.  Patient is afebrile.  No acute overnight events.














Objective





- Vital Signs


Vital signs: 


                                   Vital Signs











Temp  98.4 F   10/02/19 14:55


 


Pulse  90   10/02/19 14:55


 


Resp  18   10/02/19 14:55


 


BP  115/86   10/02/19 14:55


 


Pulse Ox  98   10/02/19 14:55








                                 Intake & Output











 10/01/19 10/02/19 10/02/19





 18:59 06:59 18:59


 


Intake Total 540 200 50


 


Balance 540 200 50


 


Intake:   


 


  IV   50


 


  Oral 540 200 


 


Other:   


 


  Voiding Method Toilet Toilet Toilet


 


  # Voids 2 1 3














- Exam


GENERAL: The patient is alert and oriented x3, not in any acute distress. Well 

developed, well nourished. 


HEENT: Pupils are round and equally reacting to light. EOMI. No scleral icterus.

No conjunctival pallor. Normocephalic, atraumatic. No pharyngeal erythema. No 

thyromegaly. 


CARDIOVASCULAR: S1 and S2 present. No murmurs, rubs, or gallops. 


PULMONARY: Chest is clear to auscultation, no wheezing or crackles. 


ABDOMEN: Soft, no tenderness in the suprapubic area and that her uterus is 

palpable and appreciable and firm.  In that area, nondistended, normoactive bow

el sounds. No palpable organomegaly. 


MUSCULOSKELETAL: No joint swelling or deformity.


EXTREMITIES: No cyanosis, clubbing, or pedal edema. 


NEUROLOGICAL: Gross neurological examination did not reveal any focal deficits. 


SKIN: No rashes.








- Labs


CBC & Chem 7: 


                                 19 15:05





                                 10/01/19 12:48





Assessment and Plan


Assessment: 


-Nausea: Probably secondary to gallstones, general surgery was consulted and the

responsibility of gastritis to patient takes nonsteroidal anti-inflammatory at 

home and will be started on Protonix.  Patient is scheduled to have a upper 

endoscope with GI sometime this afternoon.  Will await report.


-Abdominal pain may be related to enlarged uterus, gynecology was consulted. An 

ultrasound of the uterus is ordered.  Ultrasound showed possibility of multiple 

uterine fibroids present in a small left ovarian cyst measuring 1.5 cm.  OBGYN 

will follow patient in the outpatient setting. 


-Asymptomatic bacteriuria contaminated urine sample we'll repeat UA again 

although patient doesn't have any symptoms of UTI except for suprapubic pain 

which may be related to enlarged uterus


-Asthma without any acute exacerbation


-Nicotine abuse and marijuana use: Counseling was provided

## 2019-10-03 VITALS — SYSTOLIC BLOOD PRESSURE: 127 MMHG | HEART RATE: 70 BPM | DIASTOLIC BLOOD PRESSURE: 70 MMHG | TEMPERATURE: 98 F

## 2019-10-03 RX ADMIN — PANTOPRAZOLE SODIUM SCH MG: 40 TABLET, DELAYED RELEASE ORAL at 08:27

## 2019-10-03 RX ADMIN — CEFAZOLIN SCH MLS/HR: 330 INJECTION, POWDER, FOR SOLUTION INTRAMUSCULAR; INTRAVENOUS at 06:33

## 2019-10-03 NOTE — P.DS
Providers


Date of admission: 


10/01/19 10:24





Expected date of discharge: 10/03/19


Attending physician: 


Ritchie Gutierrez





Consults: 





                                        





09/30/19 01:54


Consult Physician Urgent 


   Consulting Provider: Danielle Saeed


   Consult Reason/Comments: intractable n/v/d, abdominal pain


   Do you want consulting provider notified?: Yes, Notify in am





09/30/19 12:30


Consult Physician Routine 


   Consulting Provider: Yoli Moreno


   Consult Reason/Comments: enlarged uterus


   Do you want consulting provider notified?: Yes





09/30/19 13:17


Consult Physician Routine 


   Consulting Provider: [place]


   Consult Reason/Comments: gallstones


   Do you want consulting provider notified?: Yes











Primary care physician: 


Stated None





Hospital Course: 





Final diagnosis





Nausea and vomiting


Minimal antral gastritis


Abdominal pain


Asymptomatic bacteriuria with contaminated urine sample


Asthma without any acute exacerbation


Nicotine abuse and marijuana use





Discharge disposition


Patient is being discharged in a stable condition with guarded prognosis to home

and will follow-up with primary care provider upon discharge.  She will also 

follow-up with gastroenterology in 2 weeks for biopsy results.  Patient will 

follow-up with OBGYN in the outpatient setting in 1-2 weeks.  Total time taken 

35 minutes.





History of present illness


This is a 49-year-old female who was recently admitted for nausea vomiting and 

abdominal pain and was being closely monitored.  Patient was seen by OB/GYN 

during hospitalization an ultrasound of the abdomen was ordered as well as 

pelvis showing an enlarged uterus with multiple uterine fibroids present as well

as a small ovarian cyst on the left side measuring 1.5 cm.  Patient underwent 

upper endoscope with GI showing minimal antral gastritis, small-to-moderate size

hiatal hernia, and no evidence of peptic ulcer disease.  Patient will continue 

on Protonix 40 mg daily until follow-up.  Patient was seen by surgery for 

gallstones and per surgery recommendations no interventions are being done at 

this time as there is no signs of obstruction or cholecystitis.  Patient will 

follow-up in the outpatient setting.  Patient denies any nausea or vomiting at 

this time and is tolerating some foods and will advance as tolerated.  Patient 

states that she has not had a bowel movement in a few days and was given some 

prune juice.  Currently patient's condition is stable and improved and is ready 

for discharge home.  Guarded prognosis.





On exam vital signs are stable.  Temp is 98F, pulse is 70, respirations are 16,

blood pressure is 127/70, oxygen saturation is 100% on room air.  Cardio S1 and 

S2 are present.  Respiratory system shows clear to auscultation.  Abdomen is 

soft and nontender.  Nervous system shows no focal deficits and gait is steady.





Please refer to medication reconciliation sheet for a list of medications.


Patient Condition at Discharge: Fair





Plan - Discharge Summary


Discharge Rx Participant: No


New Discharge Prescriptions: 


New


   Pantoprazole [Protonix] 40 mg PO AC-BRKFST 30 Days #30 tablet.


   Ondansetron Odt [Zofran Odt] 4 mg PO Q8HR PRN #12 tab


     PRN Reason: Nausea





Continue


   Ibuprofen [Motrin Ib] 600 mg PO Q6H PRN


     PRN Reason: Pain





Discontinued


   Zofran Unknown Dose 1 tab PO Q4H PRN


     PRN Reason: Nausea


Discharge Medication List





Ibuprofen [Motrin Ib] 600 mg PO Q6H PRN 09/29/19 [History]


Ondansetron Odt [Zofran Odt] 4 mg PO Q8HR PRN #12 tab 10/03/19 [Rx]


Pantoprazole [Protonix] 40 mg PO AC-BRKFST 30 Days #30 tablet. 10/03/19 [Rx]








Follow up Appointment(s)/Referral(s): 


Carlotta Salinas DO [Doctor of Osteopathic Medicine] - 2 Weeks


Radha Leblanc MD [REFERRING] - 1 Week


Danielle Saeed MD [STAFF PHYSICIAN] - 2 Weeks


Activity/Diet/Wound Care/Special Instructions: 


Activity Limited until follow-up


Continue current diet and advance as tolerated


Take Zofran as needed 20 minutes before eating


Follow-up with GI in 2 weeks for biopsy results


Follow-up with OB GYN in 1-2 weeks


Follow-up with primary care provider upon discharge





Discharge Disposition: HOME SELF-CARE